# Patient Record
(demographics unavailable — no encounter records)

---

## 2025-02-21 NOTE — ASSESSMENT
[FreeTextEntry1] : #HM UTD on covid Due for flu, will get at local pharmacy UTD on tdap (2019) Completed gardasil Does not need STI screening today Okay with bloodwork today

## 2025-02-21 NOTE — PLAN
[FreeTextEntry1] : #HM - Bloodwork today - Vaccine appt for flu or pt to get at local pharmacy - F/u 1 year, sooner if needed

## 2025-02-21 NOTE — HISTORY OF PRESENT ILLNESS
[de-identified] : Mr. SOUTH is a 31 year y/o M with no significant PMH who presents as a new patient today to establish care. 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 Crab (hives), pollen. 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

## 2025-02-21 NOTE — HEALTH RISK ASSESSMENT
[Yes] : Yes [2 - 4 times a month (2 pts)] : 2-4 times a month (2 points) [1 or 2 (0 pts)] : 1 or 2 (0 points) [Never (0 pts)] : Never (0 points) [0] : 2) Feeling down, depressed, or hopeless: Not at all (0) [PHQ-2 Negative - No further assessment needed] : PHQ-2 Negative - No further assessment needed [HIV test declined] : HIV test declined [Never] : Never [NO] : No